# Patient Record
Sex: FEMALE | Race: WHITE | URBAN - METROPOLITAN AREA
[De-identification: names, ages, dates, MRNs, and addresses within clinical notes are randomized per-mention and may not be internally consistent; named-entity substitution may affect disease eponyms.]

---

## 2018-04-23 ENCOUNTER — OFFICE VISIT (OUTPATIENT)
Dept: FAMILY MEDICINE CLINIC | Age: 68
End: 2018-04-23

## 2018-04-23 VITALS
BODY MASS INDEX: 28.69 KG/M2 | DIASTOLIC BLOOD PRESSURE: 84 MMHG | HEART RATE: 84 BPM | HEIGHT: 65 IN | SYSTOLIC BLOOD PRESSURE: 154 MMHG | OXYGEN SATURATION: 97 % | WEIGHT: 172.2 LBS

## 2018-04-23 DIAGNOSIS — Z78.0 POSTMENOPAUSAL: ICD-10-CM

## 2018-04-23 DIAGNOSIS — E78.00 PURE HYPERCHOLESTEROLEMIA: Primary | ICD-10-CM

## 2018-04-23 DIAGNOSIS — R63.4 WEIGHT LOSS: ICD-10-CM

## 2018-04-23 DIAGNOSIS — Z12.31 SCREENING MAMMOGRAM, ENCOUNTER FOR: ICD-10-CM

## 2018-04-23 DIAGNOSIS — H53.9 VISION CHANGES: ICD-10-CM

## 2018-04-23 DIAGNOSIS — Z11.59 ENCOUNTER FOR HEPATITIS C SCREENING TEST FOR LOW RISK PATIENT: ICD-10-CM

## 2018-04-23 DIAGNOSIS — K50.919 CROHN'S DISEASE WITH COMPLICATION, UNSPECIFIED GASTROINTESTINAL TRACT LOCATION (HCC): ICD-10-CM

## 2018-04-23 DIAGNOSIS — R03.0 ELEVATED BP WITHOUT DIAGNOSIS OF HYPERTENSION: ICD-10-CM

## 2018-04-23 PROCEDURE — G8399 PT W/DXA RESULTS DOCUMENT: HCPCS | Performed by: FAMILY MEDICINE

## 2018-04-23 PROCEDURE — G8419 CALC BMI OUT NRM PARAM NOF/U: HCPCS | Performed by: FAMILY MEDICINE

## 2018-04-23 PROCEDURE — G8427 DOCREV CUR MEDS BY ELIG CLIN: HCPCS | Performed by: FAMILY MEDICINE

## 2018-04-23 PROCEDURE — 4040F PNEUMOC VAC/ADMIN/RCVD: CPT | Performed by: FAMILY MEDICINE

## 2018-04-23 PROCEDURE — 3017F COLORECTAL CA SCREEN DOC REV: CPT | Performed by: FAMILY MEDICINE

## 2018-04-23 PROCEDURE — 1090F PRES/ABSN URINE INCON ASSESS: CPT | Performed by: FAMILY MEDICINE

## 2018-04-23 PROCEDURE — 1036F TOBACCO NON-USER: CPT | Performed by: FAMILY MEDICINE

## 2018-04-23 PROCEDURE — 99214 OFFICE O/P EST MOD 30 MIN: CPT | Performed by: FAMILY MEDICINE

## 2018-04-23 PROCEDURE — 1123F ACP DISCUSS/DSCN MKR DOCD: CPT | Performed by: FAMILY MEDICINE

## 2018-04-23 ASSESSMENT — ENCOUNTER SYMPTOMS
ABDOMINAL PAIN: 0
WHEEZING: 0
CONSTIPATION: 0
EYE REDNESS: 0
VOMITING: 0
DIARRHEA: 0
EYE PAIN: 0
CHEST TIGHTNESS: 0
EYE DISCHARGE: 0
SINUS PRESSURE: 0
BLOOD IN STOOL: 0
NAUSEA: 0
SHORTNESS OF BREATH: 0
RHINORRHEA: 0
COUGH: 0

## 2018-04-24 PROBLEM — E78.00 PURE HYPERCHOLESTEROLEMIA: Status: ACTIVE | Noted: 2018-04-24

## 2018-04-24 PROBLEM — H53.9 VISION CHANGES: Status: ACTIVE | Noted: 2018-04-24

## 2018-04-24 PROBLEM — Z11.59 ENCOUNTER FOR HEPATITIS C SCREENING TEST FOR LOW RISK PATIENT: Status: ACTIVE | Noted: 2018-04-24

## 2018-04-24 PROBLEM — Z12.31 SCREENING MAMMOGRAM, ENCOUNTER FOR: Status: ACTIVE | Noted: 2018-04-24

## 2018-04-24 PROBLEM — Z78.0 POSTMENOPAUSAL: Status: ACTIVE | Noted: 2018-04-24

## 2018-04-24 PROBLEM — R63.4 WEIGHT LOSS: Status: ACTIVE | Noted: 2018-04-24

## 2018-04-24 PROBLEM — R03.0 ELEVATED BP WITHOUT DIAGNOSIS OF HYPERTENSION: Status: ACTIVE | Noted: 2018-04-24

## 2018-04-27 ENCOUNTER — TELEPHONE (OUTPATIENT)
Dept: FAMILY MEDICINE CLINIC | Age: 68
End: 2018-04-27

## 2018-04-27 ENCOUNTER — NURSE ONLY (OUTPATIENT)
Dept: FAMILY MEDICINE CLINIC | Age: 68
End: 2018-04-27

## 2018-04-27 DIAGNOSIS — E78.00 PURE HYPERCHOLESTEROLEMIA: ICD-10-CM

## 2018-04-27 DIAGNOSIS — Z11.59 ENCOUNTER FOR HEPATITIS C SCREENING TEST FOR LOW RISK PATIENT: ICD-10-CM

## 2018-04-27 LAB
A/G RATIO: 1.8 (ref 1.1–2.2)
ALBUMIN SERPL-MCNC: 4.5 G/DL (ref 3.4–5)
ALP BLD-CCNC: 73 U/L (ref 40–129)
ALT SERPL-CCNC: 13 U/L (ref 10–40)
ANION GAP SERPL CALCULATED.3IONS-SCNC: 14 MMOL/L (ref 3–16)
AST SERPL-CCNC: 13 U/L (ref 15–37)
BILIRUB SERPL-MCNC: 0.6 MG/DL (ref 0–1)
BUN BLDV-MCNC: 13 MG/DL (ref 7–20)
CALCIUM SERPL-MCNC: 9.1 MG/DL (ref 8.3–10.6)
CHLORIDE BLD-SCNC: 102 MMOL/L (ref 99–110)
CHOLESTEROL, TOTAL: 191 MG/DL (ref 0–199)
CO2: 26 MMOL/L (ref 21–32)
CREAT SERPL-MCNC: 0.6 MG/DL (ref 0.6–1.2)
GFR AFRICAN AMERICAN: >60
GFR NON-AFRICAN AMERICAN: >60
GLOBULIN: 2.5 G/DL
GLUCOSE BLD-MCNC: 84 MG/DL (ref 70–99)
HDLC SERPL-MCNC: 43 MG/DL (ref 40–60)
HEPATITIS C ANTIBODY INTERPRETATION: NORMAL
LDL CHOLESTEROL CALCULATED: 116 MG/DL
POTASSIUM SERPL-SCNC: 3.8 MMOL/L (ref 3.5–5.1)
SODIUM BLD-SCNC: 142 MMOL/L (ref 136–145)
TOTAL PROTEIN: 7 G/DL (ref 6.4–8.2)
TRIGL SERPL-MCNC: 162 MG/DL (ref 0–150)
TSH REFLEX: 2.41 UIU/ML (ref 0.27–4.2)
VLDLC SERPL CALC-MCNC: 32 MG/DL

## 2018-04-27 PROCEDURE — 36415 COLL VENOUS BLD VENIPUNCTURE: CPT | Performed by: FAMILY MEDICINE

## 2018-04-27 NOTE — TELEPHONE ENCOUNTER
Patient is trying to schedule her mammogram at [address] [place] [address]. They are requesting orders be faxed to [phone].

## 2018-04-27 NOTE — TELEPHONE ENCOUNTER
Patient called requesting a 3D mammogram.  I advised Dr. Milena El ordered a standard mammogram.  She said UC wants this particular mammogram ordered. She was told she should have orders for a diagnostic mammogram.  Patient became urguementive.

## 2018-04-27 NOTE — TELEPHONE ENCOUNTER
Spoke with patient and advised that the imaging orders  has available to them may or may not be the same as what [facility] has in our system to order. Dr. Felipe Yen ordered a screening mammogram and may have had a reason why she ordered this instead of a diagnostic since it has been so long in between her last mammogram.  I advised her Dr. Felipe Yen is gone for the day but would forward to Dr. Felipe Yen for her opinion on the order and we would get back with her but may not be until Monday when we do.    She was fine to wait and also said she does not mind having her mammogram at [facility] instead if that would make it easier but would do whatever Dr. Felipe Yen thinks is best.

## 2018-04-30 NOTE — TELEPHONE ENCOUNTER
Marty Garcia said she will call to get patient scheduled, she agrees with Dr. Diogenes King and says patient needs to start with the screening mammogram as it has been so long since her last one in 2012.   Orders faxed to Marty Garcia at [phone] per her request.

## 2018-04-30 NOTE — TELEPHONE ENCOUNTER
Please call  breast center. The last mammo from [address] [place] [address] in 2012 said return to screening mammograms as the density was benign and had been followed for several years. Please have them look at the report from there facility and their radiologists  We don't have an order in epic that says 3D mammo. What do they want want a screen or diagnostic?

## 2018-05-24 PROBLEM — Z11.59 ENCOUNTER FOR HEPATITIS C SCREENING TEST FOR LOW RISK PATIENT: Status: RESOLVED | Noted: 2018-04-24 | Resolved: 2018-05-24

## 2018-05-24 PROBLEM — Z12.31 SCREENING MAMMOGRAM, ENCOUNTER FOR: Status: RESOLVED | Noted: 2018-04-24 | Resolved: 2018-05-24

## 2021-11-19 ENCOUNTER — TELEPHONE (OUTPATIENT)
Dept: FAMILY MEDICINE CLINIC | Age: 71
End: 2021-11-19

## 2021-11-19 NOTE — TELEPHONE ENCOUNTER
Pt wants to know if she should get covid booster shot if she's having allergy/asthma symptoms. Please contact at [phone].

## 2021-11-22 NOTE — TELEPHONE ENCOUNTER
Patient returned call and was advised. She got a new number. I updated the new number in the system.

## 2023-07-25 ENCOUNTER — TELEPHONE (OUTPATIENT)
Dept: SURGERY | Age: 73
End: 2023-07-25

## 2023-07-25 NOTE — TELEPHONE ENCOUNTER
Slides were requested by Dr. Kb Lansgton upon reviewing pt's chart and request was made to [employer] on 7/11 and then a 2nd request was requested on 7/19/2023. I notified the Derm path lab and spoke with Sharlie Goodell who informed me that they did not receive the initial slide request though I received a faxed receipt. I refaxed the request on 7/19/2023 and the associate informed me they cannot send create a [employer] airbill. I checked with management and one was created and emailed to associate on 7/21/2023. Today, 7/25, (2) slides were received via [employer] on 7/24. Pt has not been scheduled yet. Checking with Dr. Kb Langston when to schedule pt.

## 2025-05-16 ENCOUNTER — HOSPITAL ENCOUNTER (EMERGENCY)
Facility: HOSPITAL | Age: 75
Discharge: HOME | End: 2025-05-16
Attending: EMERGENCY MEDICINE
Payer: MEDICARE

## 2025-05-16 ENCOUNTER — APPOINTMENT (OUTPATIENT)
Dept: ULTRASOUND IMAGING | Facility: HOSPITAL | Age: 75
End: 2025-05-16
Attending: EMERGENCY MEDICINE
Payer: MEDICARE

## 2025-05-16 ENCOUNTER — APPOINTMENT (OUTPATIENT)
Dept: GENERAL RADIOLOGY | Facility: HOSPITAL | Age: 75
End: 2025-05-16
Attending: EMERGENCY MEDICINE
Payer: MEDICARE

## 2025-05-16 VITALS
SYSTOLIC BLOOD PRESSURE: 184 MMHG | OXYGEN SATURATION: 96 % | HEART RATE: 63 BPM | TEMPERATURE: 97 F | BODY MASS INDEX: 21.66 KG/M2 | HEIGHT: 65 IN | RESPIRATION RATE: 16 BRPM | DIASTOLIC BLOOD PRESSURE: 81 MMHG | WEIGHT: 130 LBS

## 2025-05-16 DIAGNOSIS — R60.0 PERIPHERAL EDEMA: ICD-10-CM

## 2025-05-16 DIAGNOSIS — R03.0 ELEVATED BLOOD PRESSURE READING WITHOUT DIAGNOSIS OF HYPERTENSION: Primary | ICD-10-CM

## 2025-05-16 LAB
ALBUMIN SERPL-MCNC: 4.5 G/DL (ref 3.2–4.8)
ALP LIVER SERPL-CCNC: 128 U/L (ref 55–142)
ALT SERPL-CCNC: 13 U/L (ref 10–49)
ANION GAP SERPL CALC-SCNC: 9 MMOL/L (ref 0–18)
AST SERPL-CCNC: 19 U/L (ref ?–34)
BASOPHILS # BLD AUTO: 0.04 X10(3) UL (ref 0–0.2)
BASOPHILS NFR BLD AUTO: 0.6 %
BILIRUB DIRECT SERPL-MCNC: 0.3 MG/DL (ref ?–0.3)
BILIRUB SERPL-MCNC: 1 MG/DL (ref 0.2–1.1)
BUN BLD-MCNC: 18 MG/DL (ref 9–23)
BUN/CREAT SERPL: 15.1 (ref 10–20)
CALCIUM BLD-MCNC: 9 MG/DL (ref 8.7–10.4)
CHLORIDE SERPL-SCNC: 105 MMOL/L (ref 98–112)
CO2 SERPL-SCNC: 29 MMOL/L (ref 21–32)
CREAT BLD-MCNC: 1.19 MG/DL (ref 0.55–1.02)
DEPRECATED RDW RBC AUTO: 41.5 FL (ref 35.1–46.3)
EGFRCR SERPLBLD CKD-EPI 2021: 48 ML/MIN/1.73M2 (ref 60–?)
EOSINOPHIL # BLD AUTO: 0.19 X10(3) UL (ref 0–0.7)
EOSINOPHIL NFR BLD AUTO: 2.9 %
ERYTHROCYTE [DISTWIDTH] IN BLOOD BY AUTOMATED COUNT: 12.8 % (ref 11–15)
GLUCOSE BLD-MCNC: 83 MG/DL (ref 70–99)
HCT VFR BLD AUTO: 39.6 % (ref 35–48)
HGB BLD-MCNC: 13.5 G/DL (ref 12–16)
IMM GRANULOCYTES # BLD AUTO: 0.01 X10(3) UL (ref 0–1)
IMM GRANULOCYTES NFR BLD: 0.2 %
LYMPHOCYTES # BLD AUTO: 0.96 X10(3) UL (ref 1–4)
LYMPHOCYTES NFR BLD AUTO: 14.7 %
MCH RBC QN AUTO: 30.2 PG (ref 26–34)
MCHC RBC AUTO-ENTMCNC: 34.1 G/DL (ref 31–37)
MCV RBC AUTO: 88.6 FL (ref 80–100)
MONOCYTES # BLD AUTO: 0.36 X10(3) UL (ref 0.1–1)
MONOCYTES NFR BLD AUTO: 5.5 %
NEUTROPHILS # BLD AUTO: 4.97 X10 (3) UL (ref 1.5–7.7)
NEUTROPHILS # BLD AUTO: 4.97 X10(3) UL (ref 1.5–7.7)
NEUTROPHILS NFR BLD AUTO: 76.1 %
NT-PROBNP SERPL-MCNC: 754 PG/ML (ref ?–450)
OSMOLALITY SERPL CALC.SUM OF ELEC: 297 MOSM/KG (ref 275–295)
PLATELET # BLD AUTO: 191 10(3)UL (ref 150–450)
POTASSIUM SERPL-SCNC: 3.7 MMOL/L (ref 3.5–5.1)
PROT SERPL-MCNC: 6.6 G/DL (ref 5.7–8.2)
RBC # BLD AUTO: 4.47 X10(6)UL (ref 3.8–5.3)
SODIUM SERPL-SCNC: 143 MMOL/L (ref 136–145)
TSI SER-ACNC: 1.88 UIU/ML (ref 0.55–4.78)
WBC # BLD AUTO: 6.5 X10(3) UL (ref 4–11)

## 2025-05-16 PROCEDURE — 93970 EXTREMITY STUDY: CPT | Performed by: EMERGENCY MEDICINE

## 2025-05-16 PROCEDURE — 99285 EMERGENCY DEPT VISIT HI MDM: CPT

## 2025-05-16 PROCEDURE — 85025 COMPLETE CBC W/AUTO DIFF WBC: CPT | Performed by: EMERGENCY MEDICINE

## 2025-05-16 PROCEDURE — 84443 ASSAY THYROID STIM HORMONE: CPT | Performed by: EMERGENCY MEDICINE

## 2025-05-16 PROCEDURE — 83880 ASSAY OF NATRIURETIC PEPTIDE: CPT | Performed by: EMERGENCY MEDICINE

## 2025-05-16 PROCEDURE — 80048 BASIC METABOLIC PNL TOTAL CA: CPT | Performed by: EMERGENCY MEDICINE

## 2025-05-16 PROCEDURE — 80076 HEPATIC FUNCTION PANEL: CPT | Performed by: EMERGENCY MEDICINE

## 2025-05-16 PROCEDURE — 96374 THER/PROPH/DIAG INJ IV PUSH: CPT

## 2025-05-16 PROCEDURE — 71045 X-RAY EXAM CHEST 1 VIEW: CPT | Performed by: EMERGENCY MEDICINE

## 2025-05-16 RX ORDER — FUROSEMIDE 20 MG/1
20 TABLET ORAL DAILY
Qty: 5 TABLET | Refills: 0 | Status: SHIPPED | OUTPATIENT
Start: 2025-05-16 | End: 2025-05-21

## 2025-05-16 RX ORDER — HYDROCHLOROTHIAZIDE 12.5 MG/1
12.5 TABLET ORAL ONCE
Status: COMPLETED | OUTPATIENT
Start: 2025-05-16 | End: 2025-05-16

## 2025-05-16 RX ORDER — AMLODIPINE BESYLATE 5 MG/1
5 TABLET ORAL DAILY
Qty: 14 TABLET | Refills: 0 | Status: SHIPPED | OUTPATIENT
Start: 2025-05-16 | End: 2025-05-30

## 2025-05-16 RX ORDER — FUROSEMIDE 10 MG/ML
20 INJECTION INTRAMUSCULAR; INTRAVENOUS ONCE
Status: COMPLETED | OUTPATIENT
Start: 2025-05-16 | End: 2025-05-16

## 2025-05-16 RX ORDER — HYDROCHLOROTHIAZIDE 12.5 MG/1
12.5 TABLET ORAL DAILY
Qty: 14 TABLET | Refills: 0 | Status: SHIPPED | OUTPATIENT
Start: 2025-05-16 | End: 2025-05-16

## 2025-05-16 RX ORDER — DIPHENHYDRAMINE HCL 25 MG
25 TABLET ORAL DAILY PRN
COMMUNITY

## 2025-05-16 RX ORDER — AMLODIPINE BESYLATE 5 MG/1
5 TABLET ORAL ONCE
Status: COMPLETED | OUTPATIENT
Start: 2025-05-16 | End: 2025-05-16

## 2025-05-16 RX ORDER — ASPIRIN 81 MG/1
81 TABLET ORAL DAILY PRN
COMMUNITY

## 2025-05-16 NOTE — ED QUICK NOTES
Patient presents to ED 12 from triage with c/o HTN readings at home. Patient is from out of town, drove in approx 5-6 hours yesterday and noticed that she had bilateral leg swelling. No swelling noted in ED today. CMS intact. Strong pedal pulses felt bilaterally. Denies CP/SOB. Patient also has noticed that her toes have been getting red x 1 year along with some right eye blurry vision x 1 year as well. Patient last MD visit was prior to COVID. Patient is A&O x 4. No distress noted. Respirations regular and unlabored. Call light at reach.    Family at bedside.

## 2025-05-16 NOTE — ED INITIAL ASSESSMENT (HPI)
Pt alert, oriented x4.  Arrived from home with daughter-in-law via private vehicle.  Chief complaint is high blood pressure.    Pt states had 5 hour car ride yesterday.  Bilateral foot/ankle swelling noted after arriving.  Pt also with red toes for \"a while\".  Family checked bp due to swelling - last reading this morning around 08:30 this morning (172/98).  In triage - 191/99.  Denies new blurred vision (has some chronic blurriness in right eye), double vision, headache, dizziness, LH, n/v, SOB, CP.    From out of state, visiting family.  Last PCP appt before COVID.

## 2025-05-16 NOTE — ED PROVIDER NOTES
Patient Seen in: [facility] Emergency Department    History     Chief Complaint   Patient presents with    Hypertension    Swelling Edema     Stated Complaint: Hypertension; Peripheral Edema     HPI    74-year-old female with without reported past medical history presenting with family for evaluation of lower extremity swelling in setting of visiting from [employer] after 5-hour car ride associated with elevated blood pressure to 170s for which evaluation sought.  No chest pain or shortness of breath.  No primary care evaluation for some time.  No headache or dizziness.  No decongestant or stimulant use.    Past Medical History[1]    Past Surgical History[2]         Family History[3]    Short Social Hx on File[4]    Review of Systems :  Constitutional: As per HPI  Respiratory: Negative for cough and shortness of breath.    Cardiovascular: Negative for chest pain and palpitations.   Gastrointestinal: Negative for vomiting and abdominal pain.   Genitourinary: Negative for dysuria and hematuria.   Musculoskeletal: Negative for joint swelling and arthralgias.  Positive for leg swelling.  Skin: Negative for rash. No itching.      Positive for stated complaint: Hypertension; Peripheral Edema  Other systems are as noted in HPI.  Constitutional and vital signs reviewed.      All other systems reviewed and negative except as noted above.    PSFH elements reviewed from today and agreed except as otherwise stated in HPI.    Physical Exam     ED Triage Vitals [05/16/25 0937]   BP (!) 191/99   Pulse 70   Resp 17   Temp 96.9 °F (36.1 °C)   Temp src Temporal   SpO2 97 %   O2 Device None (Room air)       Current:BP (!) 172/90   Pulse 64   Temp 96.9 °F (36.1 °C) (Temporal)   Resp 18   Ht 165.1 cm (5' 5\")   Wt 59 kg   SpO2 99%   BMI 21.63 kg/m²         Physical Exam   Constitutional: No distress.   HEENT: MMM.  Head: Normocephalic.   Eyes: No injection.   Neck: Neck supple.   Cardiovascular: RRR.   Pulmonary/Chest:  Effort normal. CTAB.  Abdominal: Soft.  Nontender.  Musculoskeletal: No gross deformity. BLE with 3+ edema without calf tenderness or palpable cord.  Neurological: Alert. BLE with 5/5 strength proximally and distally.  Skin: Skin is warm.   Psychiatric: Cooperative.  Nursing note and vitals reviewed.        ED Course     Labs Reviewed   CBC WITH DIFFERENTIAL WITH PLATELET - Abnormal; Notable for the following components:       Result Value    Lymphocyte Absolute 0.96 (*)     All other components within normal limits   BASIC METABOLIC PANEL (8)   HEPATIC FUNCTION PANEL (7)   PRO BETA NATRIURETIC PEPTIDE   RAINBOW DRAW LAVENDER   RAINBOW DRAW LIGHT GREEN   RAINBOW DRAW BLUE   RAINBOW DRAW GOLD     US VENOUS DOPPLER LEG BILAT - DIAG IMG (CPT=93970)  Result Date: 5/16/2025  PROCEDURE: US VENOUS DOPPLER LEG BILAT-DIAG IMG (CPT=93970)  COMPARISON: None.  INDICATIONS: Eval for DVT  TECHNIQUE: Color duplex Doppler venous ultrasound of both lower extremities was performed in the  usual manner.  FINDINGS:   The femoral and popliteal veins appear normal.  Normal flow was demonstrated with color and pulsed Doppler.  Visualized portions of the great and small saphenous, posterior tibial and peroneal veins appear normal.    THROMBI: None visible. COMPRESSIBILITY:   Normal. OTHER: Negative.         CONCLUSION: No bilateral lower extremity DVT.    Dictated by (CST): Diego Strickland MD on 5/16/2025 at 12:08 PM     Finalized by (CST): Diego Strickland MD on 5/16/2025 at 12:09 PM          XR CHEST AP PORTABLE  (CPT=71045)  Result Date: 5/16/2025  PROCEDURE: XR CHEST AP PORTABLE  (CPT=71045) TIME: 10 50.   COMPARISON: None.  INDICATIONS: Hypertension; Peripheral Edema  TECHNIQUE:   Single view.   FINDINGS:  CARDIAC/VASC: The heart is enlarged.  Pulmonary vascularity is upper limits of normal. MEDIAST/STEPH:   No visible mass or adenopathy. LUNGS/PLEURA: There is blunting of the bilateral costophrenic angles which may represent trace  bilateral pleural effusions.  The right lungs are otherwise clear. BONES: No fracture or visible bony lesion. OTHER: Negative.          CONCLUSION: Probable trace bilateral pleural effusions.  No other acute appearing abnormality is identified.    Dictated by (CST): Travis Munguia MD on 5/16/2025 at 11:37 AM     Finalized by (CST): Travis Munguia MD on 5/16/2025 at 11:37 AM            ED Course as of 05/16/25 1231  ------------------------------------------------------------  Time: 05/16 1228  Comment: ED course nonacute, resting comfortably.       MDM   DIFFERENTIAL DIAGNOSIS: After history and physical exam differential diagnosis includes but is not limited to DVT, CHF, renal insufficiency, hepatic insufficiency.    Pulse ox: 99%:Normal on RA, as independently interpreted by myself    Cardiac Monitor Interpretation:   Pulse Readings from Last 1 Encounters:   05/16/25 64   , sinus,      Medical Decision Making  Evaluation for asymptomatically elevated BP aside from BLE swelling without pain - labs/imaging as noted, will discharge with course of diuretics/BP meds and PCP referral for followup.    Problems Addressed:  Elevated blood pressure reading without diagnosis of hypertension: undiagnosed new problem with uncertain prognosis  Peripheral edema: acute illness or injury    Amount and/or Complexity of Data Reviewed  Labs: ordered. Decision-making details documented in ED Course.  Radiology: ordered and independent interpretation performed. Decision-making details documented in ED Course.     Details: CXR without obvious pneumothorax as independently interpreted by myself    Risk  Prescription drug management.      I was wearing at minimum a facemask and eye protection throughout this encounter with handwashing performed prior and after patient evaluation without personal hand/facial/oropharyngeal contact and gloves worn throughout encounter. See note and/or contact this provider for further PPE details.    We  recommend that you schedule follow up care with a primary care provider within the next three months to obtain basic health screening including reassessment of your blood pressure.      You had elevated blood pressure today and you need to follow up with your doctor for a repeat blood pressure check and further discussion of lifestyle modifications that include Weight Reduction - Dietary Sodium Restriction - Increased Physical Activity and Moderation in alcohol (ETOH) Consumption. If possible check your pressure at home and keep a blood pressure log to bring to your physician.  Disposition and Plan     Clinical Impression:  1. Elevated blood pressure reading without diagnosis of hypertension    2. Peripheral edema        Disposition:  Discharge    Follow-up:  Dariel Yoon MD  [address] [facility] [address]  [place] [address]  [phone]    Call  For primary care followup and re-evaluation.      Medications Prescribed:  Discharge Medication List as of 5/16/2025 12:49 PM        START taking these medications    Details   amLODIPine 5 MG Oral Tab Take 1 tablet (5 mg total) by mouth daily for 14 days., Normal, Disp-14 tablet, R-0      furosemide 20 MG Oral Tab Take 1 tablet (20 mg total) by mouth daily for 5 days., Normal, Disp-5 tablet, R-0                    [1]   Past Medical History:   Asthma (HCC)   [2]   Past Surgical History:  Procedure Laterality Date    Removal gallbladder     [3] No family history on file.  [4]   Social History  Tobacco Use    Smoking status: Former     Current packs/day: 0.00     Types: Cigarettes     Quit date: 2015     Years since quitting: 10.3    Smokeless tobacco: Never   Vaping Use    Vaping status: Some Days   Substance and Sexual Activity    Alcohol use: Not Currently    Drug use: Never

## 2025-05-27 ENCOUNTER — OFFICE VISIT (OUTPATIENT)
Dept: PRIMARY CARE CLINIC | Age: 75
End: 2025-05-27
Payer: MEDICARE

## 2025-05-27 VITALS
HEIGHT: 65 IN | HEART RATE: 70 BPM | BODY MASS INDEX: 21.92 KG/M2 | DIASTOLIC BLOOD PRESSURE: 84 MMHG | WEIGHT: 131.6 LBS | RESPIRATION RATE: 18 BRPM | OXYGEN SATURATION: 98 % | SYSTOLIC BLOOD PRESSURE: 154 MMHG

## 2025-05-27 DIAGNOSIS — K50.919 CROHN'S DISEASE WITH COMPLICATION, UNSPECIFIED GASTROINTESTINAL TRACT LOCATION (HCC): ICD-10-CM

## 2025-05-27 DIAGNOSIS — J45.20 MILD INTERMITTENT ASTHMA, UNSPECIFIED WHETHER COMPLICATED: ICD-10-CM

## 2025-05-27 DIAGNOSIS — Z76.89 ENCOUNTER TO ESTABLISH CARE: ICD-10-CM

## 2025-05-27 DIAGNOSIS — Z12.11 SCREEN FOR COLON CANCER: ICD-10-CM

## 2025-05-27 DIAGNOSIS — I10 HYPERTENSION, UNSPECIFIED TYPE: Primary | ICD-10-CM

## 2025-05-27 DIAGNOSIS — H53.8 BLURRY VISION, RIGHT EYE: ICD-10-CM

## 2025-05-27 PROCEDURE — 3017F COLORECTAL CA SCREEN DOC REV: CPT

## 2025-05-27 PROCEDURE — G8420 CALC BMI NORM PARAMETERS: HCPCS

## 2025-05-27 PROCEDURE — 3079F DIAST BP 80-89 MM HG: CPT

## 2025-05-27 PROCEDURE — 1090F PRES/ABSN URINE INCON ASSESS: CPT

## 2025-05-27 PROCEDURE — 1159F MED LIST DOCD IN RCRD: CPT

## 2025-05-27 PROCEDURE — 99204 OFFICE O/P NEW MOD 45 MIN: CPT

## 2025-05-27 PROCEDURE — 1036F TOBACCO NON-USER: CPT

## 2025-05-27 PROCEDURE — 3077F SYST BP >= 140 MM HG: CPT

## 2025-05-27 PROCEDURE — G8399 PT W/DXA RESULTS DOCUMENT: HCPCS

## 2025-05-27 PROCEDURE — G8427 DOCREV CUR MEDS BY ELIG CLIN: HCPCS

## 2025-05-27 PROCEDURE — 1123F ACP DISCUSS/DSCN MKR DOCD: CPT

## 2025-05-27 RX ORDER — HYDROCHLOROTHIAZIDE 12.5 MG/1
TABLET ORAL
COMMUNITY
Start: 2025-05-16 | End: 2025-05-27 | Stop reason: SDUPTHER

## 2025-05-27 RX ORDER — AMLODIPINE BESYLATE 5 MG/1
5 TABLET ORAL DAILY
Qty: 30 TABLET | Refills: 0 | Status: SHIPPED | OUTPATIENT
Start: 2025-05-27 | End: 2025-06-26

## 2025-05-27 RX ORDER — HYDROCHLOROTHIAZIDE 12.5 MG/1
12.5 TABLET ORAL DAILY
Qty: 30 TABLET | Refills: 1 | Status: SHIPPED | OUTPATIENT
Start: 2025-05-27

## 2025-05-27 RX ORDER — FUROSEMIDE 20 MG/1
TABLET ORAL
COMMUNITY
Start: 2025-05-16 | End: 2025-05-27 | Stop reason: ALTCHOICE

## 2025-05-27 RX ORDER — ALBUTEROL SULFATE 90 UG/1
2 INHALANT RESPIRATORY (INHALATION) 4 TIMES DAILY PRN
Qty: 18 G | Refills: 5 | Status: SHIPPED | OUTPATIENT
Start: 2025-05-27

## 2025-05-27 RX ORDER — AMLODIPINE BESYLATE 5 MG/1
5 TABLET ORAL DAILY
COMMUNITY
Start: 2025-05-16 | End: 2025-05-27 | Stop reason: SDUPTHER

## 2025-05-27 SDOH — ECONOMIC STABILITY: FOOD INSECURITY: WITHIN THE PAST 12 MONTHS, YOU WORRIED THAT YOUR FOOD WOULD RUN OUT BEFORE YOU GOT MONEY TO BUY MORE.: NEVER TRUE

## 2025-05-27 SDOH — ECONOMIC STABILITY: FOOD INSECURITY: WITHIN THE PAST 12 MONTHS, THE FOOD YOU BOUGHT JUST DIDN'T LAST AND YOU DIDN'T HAVE MONEY TO GET MORE.: NEVER TRUE

## 2025-05-27 ASSESSMENT — PATIENT HEALTH QUESTIONNAIRE - PHQ9
SUM OF ALL RESPONSES TO PHQ QUESTIONS 1-9: 0
1. LITTLE INTEREST OR PLEASURE IN DOING THINGS: NOT AT ALL
SUM OF ALL RESPONSES TO PHQ QUESTIONS 1-9: 0
2. FEELING DOWN, DEPRESSED OR HOPELESS: NOT AT ALL
SUM OF ALL RESPONSES TO PHQ QUESTIONS 1-9: 0
SUM OF ALL RESPONSES TO PHQ QUESTIONS 1-9: 0

## 2025-05-27 NOTE — ASSESSMENT & PLAN NOTE
-Chronic, stable.  Needs refill for rescue inhaler.    Orders:    albuterol sulfate HFA (VENTOLIN HFA) 108 (90 Base) MCG/ACT inhaler; Inhale 2 puffs into the lungs 4 times daily as needed for Wheezing

## 2025-05-27 NOTE — PROGRESS NOTES
Yessenia Jackson (:  1950) is a 74 y.o. female,New patient, here for evaluation of the following chief complaint(s):  New Patient (Blood pressure knot on neck eye not working right patient feet were swelling )    Assessment & Plan  Hypertension, unspecified type  - New, uncontrolled.  Continue current regiment of hydrochlorothiazide 12.5 mg and amlodipine 5 mg.  - Provided patient with blood pressure log, instructed patient to take blood pressures around the same time 2 times daily.  - Given that the patient was recently started on these medications no significant changes to the regiment were made.  Patient will follow-up in 1 month at which time if blood pressure log shows continued elevation in blood pressure will manage and readjust medications at that time.  - Most recent workup in the ED did show elevated BNP, at follow-up will discuss with patient regarding cardiac follow-up especially with echo to determine if there is any level of underlying heart failure playing a role in her lower extremity edema.    Orders:    hydroCHLOROthiazide 12.5 MG tablet; Take 1 tablet by mouth daily    amLODIPine (NORVASC) 5 MG tablet; Take 1 tablet by mouth daily    Encounter to establish care  -Patient's medical history, surgical history, family history, social determinants, medication reviewed and updated  -Aim for at least 150 minutes of moderate-intensity aerobic activity each week, combined with strength training exercises on two or more days, to boost your overall health and well-being. Incorporate a colorful variety of fruits, vegetables, whole grains, and lean proteins into your meals while minimizing processed foods and added sugars for optimal health.       Crohn's disease with complication, unspecified gastrointestinal tract location (HCC)   -Chronic, stable.  Patient without any recent flares.  -Has not seen specialist in over 10 years, has not had a colonoscopy.   - Last colonoscopy from  showed no

## 2025-05-27 NOTE — ASSESSMENT & PLAN NOTE
-Chronic, stable.  Patient without any recent flares.  -Has not seen specialist in over 10 years, has not had a colonoscopy.   - Last colonoscopy from 2015 showed no concerns for crohns's per patient.  Per EMR review there appears to be some polyps that were collected at that time.  - Referral for screening colonoscopy made.    Orders:    [facility]AMIL[place] - Amrita Whitfield MD, Gastroenterology, [facility]

## 2025-06-11 ENCOUNTER — OFFICE VISIT (OUTPATIENT)
Dept: PRIMARY CARE CLINIC | Age: 75
End: 2025-06-11

## 2025-06-11 ENCOUNTER — TELEPHONE (OUTPATIENT)
Dept: PRIMARY CARE CLINIC | Age: 75
End: 2025-06-11

## 2025-06-11 VITALS
SYSTOLIC BLOOD PRESSURE: 154 MMHG | WEIGHT: 128.6 LBS | OXYGEN SATURATION: 95 % | DIASTOLIC BLOOD PRESSURE: 86 MMHG | HEART RATE: 61 BPM | BODY MASS INDEX: 21.4 KG/M2

## 2025-06-11 DIAGNOSIS — I10 HYPERTENSION, UNSPECIFIED TYPE: ICD-10-CM

## 2025-06-11 RX ORDER — HYDROCHLOROTHIAZIDE 25 MG/1
25 TABLET ORAL DAILY
Qty: 30 TABLET | Refills: 0 | Status: SHIPPED | OUTPATIENT
Start: 2025-06-11

## 2025-06-11 NOTE — PROGRESS NOTES
Yessenia Jackson (:  1950) is a 74 y.o. female,Established patient, here for evaluation of the following chief complaint(s):  Hypertension      Assessment & Plan  Hypertension, unspecified type  Not at goal  Home blood pressure cuff checked multiple time with office BP cuff showing relatively accurate measurements  Advised patient that due to elevated blood pressure will increase HCTZ to 25mg  Continue to monitor BP daily  Follow up in 2 weeks to reassess  Goal BP < 140/90, and > 100/60.    Orders:    hydroCHLOROthiazide (HYDRODIURIL) 25 MG tablet; Take 1 tablet by mouth daily      Return in about 2 weeks (around 2025).    Subjective       Hypertension  Home measurements between 130//110  Has been complaint with medications so far  Amlodipine 5, HCTZ 12.5  Mild leg swelling  No additional concerns      Review of Systems            Objective     BP (!) 154/86 (BP Site: Left Upper Arm)   Pulse 61   Wt 58.3 kg (128 lb 9.6 oz)   SpO2 95%   BMI 21.40 kg/m²      Physical Exam  Constitutional:       Appearance: Normal appearance.   HENT:      Head: Normocephalic.   Eyes:      Conjunctiva/sclera: Conjunctivae normal.   Cardiovascular:      Rate and Rhythm: Normal rate and regular rhythm.      Pulses: Normal pulses.      Heart sounds: Normal heart sounds.   Pulmonary:      Effort: Pulmonary effort is normal.      Breath sounds: Normal breath sounds.   Abdominal:      General: Abdomen is flat. Bowel sounds are normal.      Palpations: Abdomen is soft.   Skin:     General: Skin is warm.   Neurological:      General: No focal deficit present.      Mental Status: She is alert and oriented to person, place, and time.                      An electronic signature was used to authenticate this note.    --Andrea Morales DO

## 2025-06-11 NOTE — ASSESSMENT & PLAN NOTE
Not at goal  Home blood pressure cuff checked multiple time with office BP cuff showing relatively accurate measurements  Advised patient that due to elevated blood pressure will increase HCTZ to 25mg  Continue to monitor BP daily  Follow up in 2 weeks to reassess  Goal BP < 140/90, and > 100/60.    Orders:    hydroCHLOROthiazide (HYDRODIURIL) 25 MG tablet; Take 1 tablet by mouth daily

## 2025-06-11 NOTE — PATIENT INSTRUCTIONS
Please keep a written/typed log of your blood pressure (BP) two times a day, once in the morning and again in the late afternoon, at about the same time every day.   For 30 min prior:  No smoking, caffeinated beverages, alcohol, eating, or exercise  Empty bladder  Relax for 3-5 min: sitting, feet flat on the floor, with back supported  Wrap cuff just above the bend in the elbow, over bare skin, not over clothing  Rest your arm on a table so the BP cuff is at about the same height as your heart   While blood pressure is being taken:  Relax  Don't talk  Rest the cuffed arm comfortably on a flat surface, like a table, at heart level  Sit upright, back straight and supported  Keep legs uncrossed and feet flat on the floor  Take 2 measurements at 1 min intervals  Log the average of the last 2 measurements    Goal BP < 140/90, and > 100/60.

## 2025-06-23 DIAGNOSIS — I10 HYPERTENSION, UNSPECIFIED TYPE: ICD-10-CM

## 2025-06-23 RX ORDER — AMLODIPINE BESYLATE 5 MG/1
5 TABLET ORAL DAILY
Qty: 30 TABLET | Refills: 0 | Status: SHIPPED | OUTPATIENT
Start: 2025-06-23 | End: 2025-07-23

## 2025-06-23 NOTE — TELEPHONE ENCOUNTER
Refill Request       Last Seen: Last Seen Department: 6/11/2025  Last Seen by PCP: 5/27/2025    Last Written: 5/27/25 30 0 refills     Next Appointment:   Future Appointments   Date Time Provider Department Center   6/26/2025  3:20 PM Jad Bland MD [facility] DEP             Requested Prescriptions     Pending Prescriptions Disp Refills    amLODIPine (NORVASC) 5 MG tablet [Pharmacy Med Name: amLODIPine BESYLATE 5 MG TAB] 30 tablet 0     Sig: TAKE 1 TABLET BY MOUTH DAILY

## 2025-06-26 ENCOUNTER — OFFICE VISIT (OUTPATIENT)
Dept: PRIMARY CARE CLINIC | Age: 75
End: 2025-06-26

## 2025-06-26 VITALS
HEIGHT: 65 IN | WEIGHT: 131.4 LBS | DIASTOLIC BLOOD PRESSURE: 84 MMHG | BODY MASS INDEX: 21.89 KG/M2 | OXYGEN SATURATION: 94 % | SYSTOLIC BLOOD PRESSURE: 124 MMHG | HEART RATE: 74 BPM

## 2025-06-26 DIAGNOSIS — I10 HYPERTENSION, UNSPECIFIED TYPE: Primary | ICD-10-CM

## 2025-06-26 ASSESSMENT — ENCOUNTER SYMPTOMS: SHORTNESS OF BREATH: 0

## 2025-06-26 NOTE — PROGRESS NOTES
PROGRESS NOTE   [facility]                                  [address], [address], [place] [address]         Phone: [phone]    Date of Service:  6/26/2025     Patient ID: .Yessenia Jackson is a 75 y.o. female with a past medical history of asthma, Crohn's disease, and hypertension who presents for hypertension follow up.      Subjective:     CC: Hypertension follow up     HPI  Yessenia Jackson is a 75 y.o. female with a past medical history of asthma, Crohn's disease, and hypertension who presents for hypertension follow up.    Hypertension  Currently taking amlodipine 5 mg and HCTZ 25 mg  HCTZ dose was increased from 12.5 to 25 mg at 6/11/25 visit  Patient reports one episode of lightheadedness and falling on her buttocks on the second day after her dose increase. She adds that she may have taken double her HCTZ dose that day but is not sure  She denies any episodes of lightheadedness or dizziness otherwise. Denies vision changes, headaches, leg swelling, chest pain, and shortness of breath  Home blood pressure logs show AM and PM measurements around the 130s/80s  She is curious if her BP cuff is accurate due to variations throughout the day  Patient demonstrated good BP cuff measurement etiquette in office today    ROS:    Review of Systems   Eyes:  Negative for visual disturbance.   Respiratory:  Negative for shortness of breath.    Cardiovascular:  Negative for chest pain, palpitations and leg swelling.   Neurological:  Negative for syncope and headaches.           Vitals:    06/26/25 1505 06/26/25 1538   BP: 120/60 124/84   BP Site: Left Upper Arm Left Upper Arm   Patient Position: Sitting Sitting   BP Cuff Size: Large Adult Medium Adult   Pulse: 74    SpO2: 94%    Weight: 59.6 kg (131 lb 6.4 oz)    Height: 1.651 m (5' 5\")        Allergies:  Latex, Pollen extract, Hexachlorophene, Sulfa antibiotics, Adhesive tape, Bixa hollins,

## 2025-06-26 NOTE — PROGRESS NOTES
Date of Service:  6/26/2025     Patient ID: .Yessenia Jackson is a 75 y.o. female with a past medical history of asthma, Crohn's disease, and hypertension who presents for hypertension follow up.        Subjective:      CC: Hypertension follow up      HPI  Yessenia Jackson is a 75 y.o. female with a past medical history of asthma, Crohn's disease, and hypertension who presents for hypertension follow up.     Hypertension  Currently taking amlodipine 5 mg and HCTZ 25 mg  HCTZ dose was increased from 12.5 to 25 mg at 6/11/25 visit  Patient reports one episode of lightheadedness and falling on her buttocks on the second day after her dose increase. She adds that she may have taken double her HCTZ dose that day but is not sure  She denies any episodes of lightheadedness or dizziness otherwise. Denies vision changes, headaches, leg swelling, chest pain, and shortness of breath  Home blood pressure logs show AM and PM measurements around the 130s/80s  She is curious if her BP cuff is accurate due to variations throughout the day  Patient demonstrated good BP cuff measurement etiquette in office today     ROS:     Review of Systems   Eyes:  Negative for visual disturbance.   Respiratory:  Negative for shortness of breath.    Cardiovascular:  Negative for chest pain, palpitations and leg swelling.   Neurological:  Negative for syncope and headaches.               Vitals[]Expand by Default        Vitals:     06/26/25 1505 06/26/25 1538   BP: 120/60 124/84   BP Site: Left Upper Arm Left Upper Arm   Patient Position: Sitting Sitting   BP Cuff Size: Large Adult Medium Adult   Pulse: 74     SpO2: 94%     Weight: 59.6 kg (131 lb 6.4 oz)     Height: 1.651 m (5' 5\")              Allergies:  Latex, Pollen extract, Hexachlorophene, Sulfa antibiotics, Adhesive tape, Bixa hollins, and Carrageenan     Active Medications          Outpatient Medications Marked as Taking for the 6/26/25 encounter (Office Visit) with Jad Bland MD

## 2025-06-26 NOTE — ASSESSMENT & PLAN NOTE
- Chronic, well controlled.  - Continue Amlodipine 5 mg and HCTZ 25 mg  - Did have one event of lightheadedness, says may have taken double the dose of HCTZ  - BP log: Scanned into system showed bp >140/90. BP cuff checked and showed consistently higher bp than manual measures, advised patient to get obtain a store brand bp cuff.  - Only one BMP collected in the last 5 years, with revaluate BMP and urine ACR.  - Follow up in 3 months for AWV  Orders:    Basic Metabolic Panel; Future    Albumin/Creatinine Ratio, Urine; Future

## 2025-07-14 DIAGNOSIS — I10 HYPERTENSION, UNSPECIFIED TYPE: ICD-10-CM

## 2025-07-14 RX ORDER — AMLODIPINE BESYLATE 5 MG/1
5 TABLET ORAL DAILY
Qty: 30 TABLET | Refills: 0 | Status: SHIPPED | OUTPATIENT
Start: 2025-07-14 | End: 2025-08-13

## 2025-07-14 RX ORDER — HYDROCHLOROTHIAZIDE 25 MG/1
25 TABLET ORAL DAILY
Qty: 30 TABLET | Refills: 0 | Status: SHIPPED | OUTPATIENT
Start: 2025-07-14

## 2025-07-14 NOTE — TELEPHONE ENCOUNTER
Patient is calling requesting a refill of (amLODIPine (NORVASC) 5 MG tablet ,  hydroCHLOROthiazide (HYDRODIURIL) 25 MG tablet     ) please send to (  [facility] [id number]   ) please advise patient once medication is sent  Patients Name: jaky castro   Patients phone number:[phone]    Can the patient be on automatic refill

## 2025-07-14 NOTE — TELEPHONE ENCOUNTER
Refill Request       Last Seen: Last Seen Department: 6/26/2025  Last Seen by PCP: 6/26/2025    Last Written:   amLODIPine (NORVASC) 5 MG tablet 6/23/25 30 with 0    hydroCHLOROthiazide (HYDRODIURIL) 25 MG tablet 6/11/25 30 with 0          Next Appointment:   Future Appointments   Date Time Provider Department Center   9/29/2025  3:00 PM Jad Bland MD [facility] DEP       Future appointment scheduled      Requested Prescriptions     Pending Prescriptions Disp Refills    amLODIPine (NORVASC) 5 MG tablet 30 tablet 0     Sig: Take 1 tablet by mouth daily    hydroCHLOROthiazide (HYDRODIURIL) 25 MG tablet 30 tablet 0     Sig: Take 1 tablet by mouth daily

## 2025-07-25 DIAGNOSIS — I10 HYPERTENSION, UNSPECIFIED TYPE: ICD-10-CM

## 2025-07-25 RX ORDER — AMLODIPINE BESYLATE 5 MG/1
5 TABLET ORAL DAILY
Qty: 30 TABLET | Refills: 0 | OUTPATIENT
Start: 2025-07-25 | End: 2025-08-24

## 2025-07-25 RX ORDER — HYDROCHLOROTHIAZIDE 25 MG/1
25 TABLET ORAL DAILY
Qty: 30 TABLET | Refills: 0 | OUTPATIENT
Start: 2025-07-25

## 2025-08-19 DIAGNOSIS — I10 HYPERTENSION, UNSPECIFIED TYPE: ICD-10-CM

## 2025-08-19 RX ORDER — HYDROCHLOROTHIAZIDE 25 MG/1
25 TABLET ORAL DAILY
Qty: 30 TABLET | Refills: 0 | Status: SHIPPED | OUTPATIENT
Start: 2025-08-19

## 2025-08-19 RX ORDER — AMLODIPINE BESYLATE 5 MG/1
5 TABLET ORAL DAILY
Qty: 30 TABLET | Refills: 0 | Status: SHIPPED | OUTPATIENT
Start: 2025-08-19 | End: 2025-09-18